# Patient Record
(demographics unavailable — no encounter records)

---

## 2024-10-28 NOTE — HISTORY OF PRESENT ILLNESS
[de-identified] : 11/28/24: Known to me for right JAE 2 years ago doing well.  New right knee pain x 1 month, posterior and radiates to calf.  Pain with standing from a chair.  No injury.  No prior knee issues.  NO treatments.  Improving recently, not much pain today.  No groin, back pain, no fevers/chills, no numbness/tingling. [] : no [FreeTextEntry5] : right knee pain for couple months, no injury. Having tightness down into her calf. Having posterior pain.

## 2024-10-28 NOTE — IMAGING
[Right] : right knee [AP] : anteroposterior [Lateral] : lateral [[place]] : [place] [de-identified] : Right knee: No effusion.  No tenderness.  Crepitus.  ROM 0-120.  Lig stable.  NVI.  Walks without assistance.  Neg pino sign, no calf tenderness.

## 2024-10-28 NOTE — DISCUSSION/SUMMARY
[de-identified] : The patient was advised of the diagnosis.  The natural history of the pathology was explained in full to the patient in layman's terms. All questions were answered.  The risks and benefits of surgical and non-surgical treatment alternatives were explained in full to the patient.

## 2024-10-28 NOTE — ASSESSMENT
[FreeTextEntry1] : Posterior knee/calf pain - no specific findings today.  Has mild crepitus but no obvious knee symptoms.  No signs of DVT but will check doppler r/o DVT.  Recc PT and mobic, reeval in 4 weeks.